# Patient Record
Sex: FEMALE | Race: WHITE | URBAN - METROPOLITAN AREA
[De-identification: names, ages, dates, MRNs, and addresses within clinical notes are randomized per-mention and may not be internally consistent; named-entity substitution may affect disease eponyms.]

---

## 2021-08-10 ENCOUNTER — TELEPHONE ENCOUNTER (OUTPATIENT)
Dept: URBAN - METROPOLITAN AREA CLINIC 96 | Facility: CLINIC | Age: 56
End: 2021-08-10

## 2021-08-11 ENCOUNTER — TELEPHONE ENCOUNTER (OUTPATIENT)
Dept: URBAN - METROPOLITAN AREA CLINIC 92 | Facility: CLINIC | Age: 56
End: 2021-08-11

## 2021-08-11 ENCOUNTER — LAB OUTSIDE AN ENCOUNTER (OUTPATIENT)
Dept: URBAN - METROPOLITAN AREA CLINIC 92 | Facility: CLINIC | Age: 56
End: 2021-08-11

## 2021-08-11 RX ORDER — ONDANSETRON HYDROCHLORIDE 4 MG/1
1 TABLET AS NEEDED TABLET, FILM COATED ORAL ONCE A DAY
Qty: 2 | Refills: 0 | OUTPATIENT
Start: 2021-08-11

## 2021-08-11 RX ORDER — SODIUM, POTASSIUM,MAG SULFATES 17.5-3.13G
SOLUTION, RECONSTITUTED, ORAL ORAL AS DIRECTED
Qty: 177 MILLILITER | Refills: 0 | OUTPATIENT
Start: 2021-08-11 | End: 2021-08-12

## 2021-08-18 ENCOUNTER — OFFICE VISIT (OUTPATIENT)
Dept: URBAN - METROPOLITAN AREA CLINIC 96 | Facility: CLINIC | Age: 56
End: 2021-08-18

## 2021-11-19 ENCOUNTER — OFFICE VISIT (OUTPATIENT)
Dept: URBAN - METROPOLITAN AREA SURGERY CENTER 18 | Facility: SURGERY CENTER | Age: 56
End: 2021-11-19
Payer: COMMERCIAL

## 2021-11-19 ENCOUNTER — CLAIMS CREATED FROM THE CLAIM WINDOW (OUTPATIENT)
Dept: URBAN - METROPOLITAN AREA CLINIC 4 | Facility: CLINIC | Age: 56
End: 2021-11-19
Payer: COMMERCIAL

## 2021-11-19 DIAGNOSIS — K63.89 BACTERIAL OVERGROWTH SYNDROME: ICD-10-CM

## 2021-11-19 DIAGNOSIS — Z86.010 ADENOMAS PERSONAL HISTORY OF COLONIC POLYPS: ICD-10-CM

## 2021-11-19 DIAGNOSIS — D12.0 BENIGN NEOPLASM OF CECUM: ICD-10-CM

## 2021-11-19 DIAGNOSIS — K63.89 POLYP, HYPERPLASTIC: ICD-10-CM

## 2021-11-19 DIAGNOSIS — D12.0 ADENOMA OF CECUM: ICD-10-CM

## 2021-11-19 PROCEDURE — 45380 COLONOSCOPY AND BIOPSY: CPT | Performed by: INTERNAL MEDICINE

## 2021-11-19 PROCEDURE — G8907 PT DOC NO EVENTS ON DISCHARG: HCPCS | Performed by: INTERNAL MEDICINE

## 2021-11-19 PROCEDURE — 88305 TISSUE EXAM BY PATHOLOGIST: CPT | Performed by: PATHOLOGY

## 2022-02-02 ENCOUNTER — WEB ENCOUNTER (OUTPATIENT)
Dept: URBAN - METROPOLITAN AREA CLINIC 12 | Facility: CLINIC | Age: 57
End: 2022-02-02

## 2022-02-02 ENCOUNTER — OFFICE VISIT (OUTPATIENT)
Dept: URBAN - METROPOLITAN AREA CLINIC 12 | Facility: CLINIC | Age: 57
End: 2022-02-02
Payer: COMMERCIAL

## 2022-02-02 DIAGNOSIS — R74.8 ELEVATED LIVER ENZYMES: ICD-10-CM

## 2022-02-02 DIAGNOSIS — F10.10 ETOH ABUSE: ICD-10-CM

## 2022-02-02 DIAGNOSIS — E78.00 ELEVATED CHOLESTEROL: ICD-10-CM

## 2022-02-02 DIAGNOSIS — Z86.010 PERSONAL HISTORY OF COLONIC POLYPS: ICD-10-CM

## 2022-02-02 DIAGNOSIS — K76.0 FATTY LIVER: ICD-10-CM

## 2022-02-02 PROCEDURE — G9903 PT SCRN TBCO ID AS NON USER: HCPCS | Performed by: INTERNAL MEDICINE

## 2022-02-02 PROCEDURE — 99243 OFF/OP CNSLTJ NEW/EST LOW 30: CPT | Performed by: INTERNAL MEDICINE

## 2022-02-02 PROCEDURE — G8427 DOCREV CUR MEDS BY ELIG CLIN: HCPCS | Performed by: INTERNAL MEDICINE

## 2022-02-02 PROCEDURE — 3017F COLORECTAL CA SCREEN DOC REV: CPT | Performed by: INTERNAL MEDICINE

## 2022-02-02 PROCEDURE — 99203 OFFICE O/P NEW LOW 30 MIN: CPT | Performed by: INTERNAL MEDICINE

## 2022-02-02 PROCEDURE — G9622 NO UNHEAL ETOH USER: HCPCS | Performed by: INTERNAL MEDICINE

## 2022-02-02 PROCEDURE — 1036F TOBACCO NON-USER: CPT | Performed by: INTERNAL MEDICINE

## 2022-02-02 PROCEDURE — G8420 CALC BMI NORM PARAMETERS: HCPCS | Performed by: INTERNAL MEDICINE

## 2022-02-02 RX ORDER — ONDANSETRON HYDROCHLORIDE 4 MG/1
1 TABLET AS NEEDED TABLET, FILM COATED ORAL ONCE A DAY
Qty: 2 | Refills: 0 | Status: DISCONTINUED | COMMUNITY
Start: 2021-08-11

## 2022-02-02 NOTE — HPI-TODAY'S VISIT:
57 yo   female  presenting for evaluation for elevated liver enzymes kindly referred by Dr. Ryanne Wyatt.  A copy of this note will be sent to the referring physician *states she doesn't understand why she is here and doesn't recalls eeing Dr. Harden in  2018. She was seen in April 2018 for fatty liver-at that time ST and ALT had shown elevated ALT of 41. Elevated cholesterol of 237 and regular. 199. We also discussed heavy use alcohol at that time. She had been advised to cut down on alcohol intake completely given the likelihood this was the cause of the fatty liver. Also at that time had recommended proceeding with statin use to control the cholesterol . Patient did not follow up  -found to have liver enzyme elevation on routine labs/test-  has been told previously there are elevated enzymes- but states she hadn't gone back to doctor since 2018 new medication- losartan in the past 6 months current medications-losartan , trimethoprim, effexor new herbal supplement- mvi,  milk thistle etoh, hx ivdu - no drinks in January however before that has been one bottle of wine a night.  at least a year of this level of etoh however in 2018 note 1 bottle a night is documeted hx of d- none , however has hx of high cholesterol- currently LDL is 180 -fh of liver disease abdominal pain, abdominal swelling, yellowing of skin, darkening of urine -

## 2022-02-02 NOTE — PHYSICAL EXAM CARDIOVASCULAR:
no edema,  no murmurs,  regular rate and rhythm , S1, S2 normal, no murmurs, regular rate and rhythm,no peripheral edema

## 2022-02-02 NOTE — PHYSICAL EXAM GASTROINTESTINAL
Abdomen , soft, nontender, nondistended , no guarding or rigidity , no masses palpable , normal bowel sounds , Liver and Spleen , no hepatomegaly present , no hepatosplenomegaly , liver nontender , spleen not palpable , Abdomen- soft, nontender, nondistended , no guarding or rigidity , no masses palpable , normal bowel sounds

## 2022-02-02 NOTE — PHYSICAL EXAM CHEST:
no lesions,  no deformities,  no traumatic injuries,  no significant scars are present,  chest wall non-tender, breathing is unlabored without accessory muscle use,normal breath sounds , chest wall non-tender,breathing is unlabored without accessory muscle use,normal breath sounds

## 2022-02-16 ENCOUNTER — LAB OUTSIDE AN ENCOUNTER (OUTPATIENT)
Dept: URBAN - METROPOLITAN AREA CLINIC 23 | Facility: CLINIC | Age: 57
End: 2022-02-16

## 2022-02-17 ENCOUNTER — WEB ENCOUNTER (OUTPATIENT)
Dept: URBAN - METROPOLITAN AREA CLINIC 12 | Facility: CLINIC | Age: 57
End: 2022-02-17

## 2022-02-17 ENCOUNTER — WEB ENCOUNTER (OUTPATIENT)
Dept: URBAN - METROPOLITAN AREA CLINIC 92 | Facility: CLINIC | Age: 57
End: 2022-02-17

## 2022-02-19 LAB
A/G RATIO: 2.3
ACTIN (SMOOTH MUSCLE) ANTIBODY: 2
AFP, SERUM, TUMOR MARKER: 9.4
ALBUMIN: 5.4
ALKALINE PHOSPHATASE: 66
ALPHA 2-MACROGLOBULINS, QN: 157
ALPHA-1-ANTITRYPSIN, SERUM: 125
ALT (SGPT) P5P: 130
ALT (SGPT): 113
ANA DIRECT: NEGATIVE
APOLIPOPROTEIN A-1: 167
APTT: 26
AST (SGOT) P5P: 56
AST (SGOT): 52
BASO (ABSOLUTE): 0
BASOS: 0
BILIRUBIN, TOTAL: 0.4
BILIRUBIN, TOTAL: 0.5
BUN/CREATININE RATIO: 15
BUN: 14
CALCIUM: 10.6
CARBON DIOXIDE, TOTAL: 21
CERULOPLASMIN: 22.4
CHLORIDE: 100
CHOLESTEROL, TOTAL: 307
COMMENT:: (no result)
CREATINE KINASE,TOTAL: 73
CREATININE: 0.93
EGFR IF AFRICN AM: 80
EGFR IF NONAFRICN AM: 69
EOS (ABSOLUTE): 0.2
EOS: 3
FERRITIN, SERUM: 1025
FIBROSIS SCORE: 0.14
FIBROSIS SCORING:: (no result)
FIBROSIS STAGE: (no result)
GGT: 47
GGT: 48
GLOBULIN, TOTAL: 2.3
GLUCOSE, SERUM: 87
GLUCOSE: 87
HAPTOGLOBIN: 84
HBSAG SCREEN: NEGATIVE
HCV AB: <0.1
HEIGHT:: 64
HEMATOCRIT: 43.9
HEMATOLOGY COMMENTS:: (no result)
HEMOGLOBIN: 14.5
HEP B CORE AB, TOT: NEGATIVE
HEPATITIS B SURF AB QUANT: 4.6
IMMATURE CELLS: (no result)
IMMATURE GRANS (ABS): 0
IMMATURE GRANULOCYTES: 0
INR: 0.9
INTERPRETATION:: (no result)
INTERPRETATIONS:: (no result)
IRON BIND.CAP.(TIBC): 314
IRON SATURATION: 20
IRON: 64
LIMITATIONS:: (no result)
LIVER-KIDNEY MICROSOMAL AB: 0.7
LYMPHS (ABSOLUTE): 3.6
LYMPHS: 42
MCH: 30.3
MCHC: 33
MCV: 92
MITOCHONDRIAL (M2) ANTIBODY: <20
MONOCYTES(ABSOLUTE): 0.6
MONOCYTES: 7
NASH GRADE: (no result)
NASH SCORE: 0.75
NASH SCORING: (no result)
NEUTROPHILS (ABSOLUTE): 4.1
NEUTROPHILS: 48
NRBC: (no result)
PLATELETS: 336
POTASSIUM: 4
PROTEIN, TOTAL: 7.7
PROTHROMBIN TIME: 9.8
RBC: 4.79
RDW: 12.4
SODIUM: 139
STEATOSIS GRADE: (no result)
STEATOSIS GRADING: (no result)
STEATOSIS SCORE: 0.73
TRIGLYCERIDES: 243
UIBC: 250
WBC: 8.6
WEIGHT:: 152

## 2022-03-03 ENCOUNTER — WEB ENCOUNTER (OUTPATIENT)
Dept: URBAN - METROPOLITAN AREA CLINIC 12 | Facility: CLINIC | Age: 57
End: 2022-03-03

## 2022-03-07 ENCOUNTER — LAB OUTSIDE AN ENCOUNTER (OUTPATIENT)
Dept: URBAN - METROPOLITAN AREA CLINIC 23 | Facility: CLINIC | Age: 57
End: 2022-03-07

## 2022-03-14 ENCOUNTER — TELEPHONE ENCOUNTER (OUTPATIENT)
Dept: URBAN - METROPOLITAN AREA CLINIC 92 | Facility: CLINIC | Age: 57
End: 2022-03-14

## 2022-03-14 LAB — HEREDITARY  HEMOCHROMATOSIS: (no result)

## 2022-03-16 ENCOUNTER — TELEPHONE ENCOUNTER (OUTPATIENT)
Dept: URBAN - METROPOLITAN AREA CLINIC 12 | Facility: CLINIC | Age: 57
End: 2022-03-16

## 2022-04-05 ENCOUNTER — WEB ENCOUNTER (OUTPATIENT)
Dept: URBAN - METROPOLITAN AREA CLINIC 12 | Facility: CLINIC | Age: 57
End: 2022-04-05

## 2022-04-05 ENCOUNTER — TELEPHONE ENCOUNTER (OUTPATIENT)
Dept: URBAN - METROPOLITAN AREA CLINIC 92 | Facility: CLINIC | Age: 57
End: 2022-04-05

## 2022-04-27 ENCOUNTER — OFFICE VISIT (OUTPATIENT)
Dept: URBAN - METROPOLITAN AREA CLINIC 12 | Facility: CLINIC | Age: 57
End: 2022-04-27

## 2022-06-08 ENCOUNTER — CLAIMS CREATED FROM THE CLAIM WINDOW (OUTPATIENT)
Dept: URBAN - METROPOLITAN AREA CLINIC 12 | Facility: CLINIC | Age: 57
End: 2022-06-08
Payer: COMMERCIAL

## 2022-06-08 ENCOUNTER — DASHBOARD ENCOUNTERS (OUTPATIENT)
Age: 57
End: 2022-06-08

## 2022-06-08 DIAGNOSIS — R77.2 ELEVATED AFP: ICD-10-CM

## 2022-06-08 DIAGNOSIS — Z86.010 PERSONAL HISTORY OF COLONIC POLYPS: ICD-10-CM

## 2022-06-08 DIAGNOSIS — K76.0 FATTY LIVER: ICD-10-CM

## 2022-06-08 DIAGNOSIS — F10.10 ETOH ABUSE: ICD-10-CM

## 2022-06-08 DIAGNOSIS — E78.00 ELEVATED CHOLESTEROL: ICD-10-CM

## 2022-06-08 DIAGNOSIS — R74.8 ELEVATED LIVER ENZYMES: ICD-10-CM

## 2022-06-08 PROCEDURE — 91200 LIVER ELASTOGRAPHY: CPT | Performed by: INTERNAL MEDICINE

## 2022-06-08 PROCEDURE — G8420 CALC BMI NORM PARAMETERS: HCPCS | Performed by: INTERNAL MEDICINE

## 2022-06-08 PROCEDURE — 99214 OFFICE O/P EST MOD 30 MIN: CPT | Performed by: INTERNAL MEDICINE

## 2022-06-08 PROCEDURE — G8427 DOCREV CUR MEDS BY ELIG CLIN: HCPCS | Performed by: INTERNAL MEDICINE

## 2022-06-08 PROCEDURE — 1036F TOBACCO NON-USER: CPT | Performed by: INTERNAL MEDICINE

## 2022-06-08 PROCEDURE — 3017F COLORECTAL CA SCREEN DOC REV: CPT | Performed by: INTERNAL MEDICINE

## 2022-06-08 PROCEDURE — G9622 NO UNHEAL ETOH USER: HCPCS | Performed by: INTERNAL MEDICINE

## 2022-06-08 NOTE — PREVIOUS WORKUP REVIEWED
.ENDOSCOPIESLABSLDL in 4/2022- 133           12/2021- 180ALT 61 on 4/2022 lasALT 133 in 2/2022IMAGESUltrasound February 5, 2022, hepatic steatosis otherwise unremarkable no gallstones.MRI liver march 2022 fatty infiltration of the liver otherwise unremarkable exam no liver lesions seen.

## 2022-06-08 NOTE — HPI-TODAY'S VISIT:
55 yo   female  presenting for evaluation for elevated liver enzymes kindly referred by Dr. Ryanne Wyatt.  A copy of this note will be sent to the referring physician *states she doesn't understand why she is here and doesn't recalls eeing Dr. Harden in  2018. She was seen in April 2018 for fatty liver-at that time ST and ALT had shown elevated ALT of 41. Elevated cholesterol of 237 and regular. 199. We also discussed heavy use alcohol at that time. She had been advised to cut down on alcohol intake completely given the likelihood this was the cause of the fatty liver. Also at that time had recommended proceeding with statin use to control the cholesterol . Patient did not follow up  -found to have liver enzyme elevation on routine labs/test-  has been told previously there are elevated enzymes- but states she hadn't gone back to doctor since 2018 new medication- losartan in the past 6 months current medications-losartan , trimethoprim, effexor new herbal supplement- mvi,  milk thistle etoh, hx ivdu - no drinks in January however before that has been one bottle of wine a night.  at least a year of this level of etoh however in 2018 note 1 bottle a night is documeted hx of d- none , however has hx of high cholesterol- currently LDL is 180 -fh of liver disease abdominal pain, abdominal swelling, yellowing of skin, darkening of urine ================================================================================ 6/8/2022 her last drink was 4 months ago, she has stopped completely she has overall been feeling great and doing well good appetite no complaints at this time

## 2022-06-09 LAB
A/G RATIO: 2.7
AFP, SERUM, TUMOR MARKER: 7.5
ALBUMIN: 4.9
ALKALINE PHOSPHATASE: 74
ALT (SGPT): 54
AST (SGOT): 31
BILIRUBIN, TOTAL: 0.4
BUN/CREATININE RATIO: 13
BUN: 12
CALCIUM: 10.5
CARBON DIOXIDE, TOTAL: 24
CHLORIDE: 103
CREATININE: 0.95
EGFR: 71
FERRITIN, SERUM: 692
GLOBULIN, TOTAL: 1.8
GLUCOSE: 88
POTASSIUM: 5.2
PROTEIN, TOTAL: 6.7
SODIUM: 141

## 2022-06-13 ENCOUNTER — TELEPHONE ENCOUNTER (OUTPATIENT)
Dept: URBAN - METROPOLITAN AREA CLINIC 92 | Facility: CLINIC | Age: 57
End: 2022-06-13

## 2022-06-13 ENCOUNTER — WEB ENCOUNTER (OUTPATIENT)
Dept: URBAN - METROPOLITAN AREA CLINIC 12 | Facility: CLINIC | Age: 57
End: 2022-06-13

## 2022-06-22 PROBLEM — 197321007 FATTY LIVER: Status: ACTIVE | Noted: 2022-02-02

## 2022-06-22 PROBLEM — 13644009: Status: ACTIVE | Noted: 2022-02-17

## 2022-06-22 PROBLEM — 428283002: Status: ACTIVE | Noted: 2021-08-11

## 2022-06-22 PROBLEM — 15167005 ETHANOL ABUSE: Status: ACTIVE | Noted: 2022-02-02

## 2022-12-23 ENCOUNTER — TELEPHONE ENCOUNTER (OUTPATIENT)
Dept: URBAN - METROPOLITAN AREA CLINIC 92 | Facility: CLINIC | Age: 57
End: 2022-12-23

## 2023-01-11 NOTE — PHYSICAL EXAM CONSTITUTIONAL:
well developed, well nourished , in no acute distress , ambulating without difficulty , normal communication ability Rifampin Pregnancy And Lactation Text: This medication is Pregnancy Category C and it isn't know if it is safe during pregnancy. It is also excreted in breast milk and should not be used if you are breast feeding.